# Patient Record
Sex: MALE | Race: WHITE
[De-identification: names, ages, dates, MRNs, and addresses within clinical notes are randomized per-mention and may not be internally consistent; named-entity substitution may affect disease eponyms.]

---

## 2018-11-29 ENCOUNTER — HOSPITAL ENCOUNTER (EMERGENCY)
Dept: HOSPITAL 54 - ER | Age: 33
Discharge: HOME | End: 2018-11-29
Payer: COMMERCIAL

## 2018-11-29 DIAGNOSIS — Z02.89: Primary | ICD-10-CM

## 2018-11-29 LAB
ALBUMIN SERPL BCP-MCNC: 4.5 G/DL (ref 3.4–5)
ALP SERPL-CCNC: 86 U/L (ref 46–116)
ALT SERPL W P-5'-P-CCNC: 54 U/L (ref 12–78)
APAP SERPL-MCNC: 0 UG/ML (ref 10–30)
AST SERPL W P-5'-P-CCNC: 43 U/L (ref 15–37)
BASOPHILS # BLD AUTO: 0.1 /CMM (ref 0–0.2)
BASOPHILS NFR BLD AUTO: 1.3 % (ref 0–2)
BILIRUB DIRECT SERPL-MCNC: 0.1 MG/DL (ref 0–0.2)
BILIRUB SERPL-MCNC: 0.4 MG/DL (ref 0.2–1)
BUN SERPL-MCNC: 10 MG/DL (ref 7–18)
CALCIUM SERPL-MCNC: 9.9 MG/DL (ref 8.5–10.1)
CHLORIDE SERPL-SCNC: 99 MMOL/L (ref 98–107)
CO2 SERPL-SCNC: 27 MMOL/L (ref 21–32)
CREAT SERPL-MCNC: 0.9 MG/DL (ref 0.6–1.3)
EOSINOPHIL NFR BLD AUTO: 0.9 % (ref 0–6)
ETHANOL SERPL-MCNC: < 3 MG/DL (ref 0–0)
GLUCOSE SERPL-MCNC: 105 MG/DL (ref 74–106)
HCT VFR BLD AUTO: 44 % (ref 39–51)
HGB BLD-MCNC: 15.3 G/DL (ref 13.5–17.5)
LYMPHOCYTES NFR BLD AUTO: 1.3 /CMM (ref 0.8–4.8)
LYMPHOCYTES NFR BLD AUTO: 14.1 % (ref 20–44)
MCHC RBC AUTO-ENTMCNC: 35 G/DL (ref 31–36)
MCV RBC AUTO: 82 FL (ref 80–96)
MONOCYTES NFR BLD AUTO: 0.5 /CMM (ref 0.1–1.3)
MONOCYTES NFR BLD AUTO: 5.8 % (ref 2–12)
NEUTROPHILS # BLD AUTO: 7 /CMM (ref 1.8–8.9)
NEUTROPHILS NFR BLD AUTO: 77.9 % (ref 43–81)
PLATELET # BLD AUTO: 334 /CMM (ref 150–450)
POTASSIUM SERPL-SCNC: 4.8 MMOL/L (ref 3.5–5.1)
PROT SERPL-MCNC: 9.3 G/DL (ref 6.4–8.2)
RBC # BLD AUTO: 5.38 MIL/UL (ref 4.5–6)
SALICYLATES SERPL-MCNC: 1.9 MG/DL (ref 2.8–20)
SODIUM SERPL-SCNC: 137 MMOL/L (ref 136–145)
WBC NRBC COR # BLD AUTO: 8.9 K/UL (ref 4.3–11)

## 2018-11-29 PROCEDURE — A4606 OXYGEN PROBE USED W OXIMETER: HCPCS

## 2018-11-29 PROCEDURE — G0480 DRUG TEST DEF 1-7 CLASSES: HCPCS

## 2018-11-29 PROCEDURE — Z7610: HCPCS

## 2018-11-29 NOTE — NUR
PT STATES DENIES SI/HI AT THIS TIME. PT STATES " I JUST WANTED A PLACED TO WARM 
UP, MY DAD LIVES DOWN THE STREET" 



Patient discharged to home in stable condition. Written and verbal after care 
instructions given. Patient verbalizes understanding of instruction. ambulatory 
with a steady gait

## 2020-04-08 ENCOUNTER — HOSPITAL ENCOUNTER (EMERGENCY)
Dept: HOSPITAL 54 - ER | Age: 35
LOS: 1 days | Discharge: PSYCHIATRIC OR SUBSTANCE USE FACILITY | End: 2020-04-09
Payer: COMMERCIAL

## 2020-04-08 VITALS — WEIGHT: 235 LBS | HEIGHT: 71 IN | BODY MASS INDEX: 32.9 KG/M2

## 2020-04-08 DIAGNOSIS — R45.851: Primary | ICD-10-CM

## 2020-04-08 DIAGNOSIS — F20.9: ICD-10-CM

## 2020-04-08 DIAGNOSIS — I10: ICD-10-CM

## 2020-04-08 DIAGNOSIS — E11.9: ICD-10-CM

## 2020-04-08 DIAGNOSIS — Z88.0: ICD-10-CM

## 2020-04-08 DIAGNOSIS — F32.9: ICD-10-CM

## 2020-04-08 LAB
ALBUMIN SERPL BCP-MCNC: 4.3 G/DL (ref 3.4–5)
ALP SERPL-CCNC: 86 U/L (ref 46–116)
ALT SERPL W P-5'-P-CCNC: 32 U/L (ref 12–78)
APAP SERPL-MCNC: 0 UG/ML (ref 10–30)
AST SERPL W P-5'-P-CCNC: 28 U/L (ref 15–37)
BASOPHILS # BLD AUTO: 0.1 /CMM (ref 0–0.2)
BASOPHILS NFR BLD AUTO: 1.5 % (ref 0–2)
BILIRUB DIRECT SERPL-MCNC: 0.1 MG/DL (ref 0–0.2)
BILIRUB SERPL-MCNC: 0.5 MG/DL (ref 0.2–1)
BILIRUB UR QL STRIP: (no result)
BUN SERPL-MCNC: 15 MG/DL (ref 7–18)
CALCIUM SERPL-MCNC: 9.5 MG/DL (ref 8.5–10.1)
CHLORIDE SERPL-SCNC: 104 MMOL/L (ref 98–107)
CO2 SERPL-SCNC: 27 MMOL/L (ref 21–32)
CREAT SERPL-MCNC: 1.2 MG/DL (ref 0.6–1.3)
EOSINOPHIL NFR BLD AUTO: 0.8 % (ref 0–6)
ETHANOL SERPL-MCNC: < 3 MG/DL (ref 0–0)
GLUCOSE SERPL-MCNC: 113 MG/DL (ref 74–106)
HCT VFR BLD AUTO: 46 % (ref 39–51)
HGB BLD-MCNC: 15.5 G/DL (ref 13.5–17.5)
KETONES UR STRIP-MCNC: 15 MG/DL
LYMPHOCYTES NFR BLD AUTO: 2.1 /CMM (ref 0.8–4.8)
LYMPHOCYTES NFR BLD AUTO: 30.9 % (ref 20–44)
MCHC RBC AUTO-ENTMCNC: 34 G/DL (ref 31–36)
MCV RBC AUTO: 86 FL (ref 80–96)
MONOCYTES NFR BLD AUTO: 0.6 /CMM (ref 0.1–1.3)
MONOCYTES NFR BLD AUTO: 9.2 % (ref 2–12)
NEUTROPHILS # BLD AUTO: 3.9 /CMM (ref 1.8–8.9)
NEUTROPHILS NFR BLD AUTO: 57.6 % (ref 43–81)
PH UR STRIP: 6 [PH] (ref 5–8)
PLATELET # BLD AUTO: 286 /CMM (ref 150–450)
POTASSIUM SERPL-SCNC: 4.1 MMOL/L (ref 3.5–5.1)
PROT SERPL-MCNC: 8.3 G/DL (ref 6.4–8.2)
PROT UR QL STRIP: 100 MG/DL
RBC # BLD AUTO: 5.31 MIL/UL (ref 4.5–6)
RBC #/AREA URNS HPF: (no result) /HPF (ref 0–2)
SALICYLATES SERPL-MCNC: 2.4 MG/DL (ref 2.8–20)
SODIUM SERPL-SCNC: 141 MMOL/L (ref 136–145)
UROBILINOGEN UR STRIP-MCNC: 0.2 EU/DL
WBC #/AREA URNS HPF: (no result) /HPF (ref 0–3)
WBC NRBC COR # BLD AUTO: 6.7 K/UL (ref 4.3–11)

## 2020-04-08 PROCEDURE — 80307 DRUG TEST PRSMV CHEM ANLYZR: CPT

## 2020-04-08 PROCEDURE — 80048 BASIC METABOLIC PNL TOTAL CA: CPT

## 2020-04-08 PROCEDURE — 99285 EMERGENCY DEPT VISIT HI MDM: CPT

## 2020-04-08 PROCEDURE — 85025 COMPLETE CBC W/AUTO DIFF WBC: CPT

## 2020-04-08 PROCEDURE — 80076 HEPATIC FUNCTION PANEL: CPT

## 2020-04-08 PROCEDURE — 80305 DRUG TEST PRSMV DIR OPT OBS: CPT

## 2020-04-08 PROCEDURE — 81001 URINALYSIS AUTO W/SCOPE: CPT

## 2020-04-08 PROCEDURE — G0480 DRUG TEST DEF 1-7 CLASSES: HCPCS

## 2020-04-08 PROCEDURE — 80329 ANALGESICS NON-OPIOID 1 OR 2: CPT

## 2020-04-08 PROCEDURE — 36415 COLL VENOUS BLD VENIPUNCTURE: CPT

## 2020-04-08 NOTE — NUR
PER AMRITA FROM [facility], PT IS ACCEPTED TO [facility] AFTER 0700

ACCEPTING MD: DR. LOGAN

NUMBER FOR REPORT: [phone]

ROOM ASSIGNMENT: 106-A

## 2020-04-08 NOTE — NUR
PT AAOX4. AMBULATORY WITH STEADY GAIT. BIBSELF C/O SI TO JUMP OFF A BRIDGE. PT 
PLACED IN BED 13. PLACED IN A GOWN, BELONINGS PLACED IN LOCKER. PT DENIES ANY 
PAIN. RR EVEN AND UNLABORED. VSS. WILL CONTINUE TO MONITOR.

## 2020-04-09 VITALS — SYSTOLIC BLOOD PRESSURE: 129 MMHG | DIASTOLIC BLOOD PRESSURE: 86 MMHG

## 2020-04-09 NOTE — NUR
PT ON CONSTANT OBSERVATION AND RESTING COMFORTABLY IN BED. VSS. NO ACUTE 
DISTRESS NOTED. 1:1 SITTER AT BEDSIDE FOR SAFETY

## 2020-04-09 NOTE — NUR
Patient picked up by [facility] in stable condition. [facility] All belongings returned to the patient.

## 2020-08-06 ENCOUNTER — HOSPITAL ENCOUNTER (EMERGENCY)
Dept: HOSPITAL 54 - ER | Age: 35
LOS: 1 days | Discharge: PSYCHIATRIC OR SUBSTANCE USE FACILITY | End: 2020-08-07
Payer: COMMERCIAL

## 2020-08-06 ENCOUNTER — HOSPITAL ENCOUNTER (EMERGENCY)
Dept: HOSPITAL 87 - ER | Age: 35
Discharge: HOME | End: 2020-08-06
Payer: COMMERCIAL

## 2020-08-06 VITALS — SYSTOLIC BLOOD PRESSURE: 115 MMHG | DIASTOLIC BLOOD PRESSURE: 80 MMHG

## 2020-08-06 VITALS — WEIGHT: 220 LBS | BODY MASS INDEX: 30.8 KG/M2 | HEIGHT: 71 IN

## 2020-08-06 VITALS — HEIGHT: 74 IN | WEIGHT: 220.46 LBS | BODY MASS INDEX: 28.29 KG/M2

## 2020-08-06 DIAGNOSIS — R45.850: ICD-10-CM

## 2020-08-06 DIAGNOSIS — R45.851: Primary | ICD-10-CM

## 2020-08-06 DIAGNOSIS — I10: ICD-10-CM

## 2020-08-06 DIAGNOSIS — R44.0: ICD-10-CM

## 2020-08-06 DIAGNOSIS — E11.9: ICD-10-CM

## 2020-08-06 DIAGNOSIS — R46.2: ICD-10-CM

## 2020-08-06 DIAGNOSIS — Z88.0: ICD-10-CM

## 2020-08-06 DIAGNOSIS — F32.9: ICD-10-CM

## 2020-08-06 LAB
ALBUMIN SERPL BCP-MCNC: 4 G/DL (ref 3.4–5)
ALP SERPL-CCNC: 92 U/L (ref 46–116)
ALT SERPL W P-5'-P-CCNC: 28 U/L (ref 12–78)
AMPHETAMINES UR QL SCN: NEGATIVE
APAP SERPL-MCNC: < 2 UG/ML (ref 10–30)
APPEARANCE UR: CLEAR
AST SERPL W P-5'-P-CCNC: 26 U/L (ref 15–37)
BARBITURATES UR QL SCN: NEGATIVE
BASOPHILS # BLD AUTO: 0 /CMM (ref 0–0.2)
BASOPHILS NFR BLD AUTO: 0.8 % (ref 0–2)
BASOPHILS NFR BLD AUTO: 1 % (ref 0–2)
BENZODIAZ UR QL SCN: NEGATIVE
BILIRUB DIRECT SERPL-MCNC: 0.1 MG/DL (ref 0–0.2)
BILIRUB SERPL-MCNC: 0.2 MG/DL (ref 0.2–1)
BUN SERPL-MCNC: 19 MG/DL (ref 7–18)
BZE UR QL SCN: NEGATIVE
CALCIUM SERPL-MCNC: 9.1 MG/DL (ref 8.5–10.1)
CANNABINOIDS UR QL SCN: (no result)
CHLORIDE SERPL-SCNC: 107 MMOL/L (ref 98–107)
CHLORIDE SERPL-SCNC: 109 MEQ/L (ref 98–107)
CO2 SERPL-SCNC: 28 MMOL/L (ref 21–32)
COLOR UR: YELLOW
CREAT SERPL-MCNC: 1.1 MG/DL (ref 0.6–1.3)
EOSINOPHIL NFR BLD AUTO: 0.1 % (ref 0–5)
EOSINOPHIL NFR BLD AUTO: 1.1 % (ref 0–6)
ERYTHROCYTE [DISTWIDTH] IN BLOOD BY AUTOMATED COUNT: 14.2 % (ref 11.6–14.6)
ETHANOL SERPL-MCNC: < 10 MG/DL
ETHANOL SERPL-MCNC: < 3 MG/DL (ref 0–0)
GLUCOSE SERPL-MCNC: 103 MG/DL (ref 74–106)
HCT VFR BLD AUTO: 41.2 % (ref 42–52)
HCT VFR BLD AUTO: 43 % (ref 39–51)
HGB BLD-MCNC: 14 G/DL (ref 14–18)
HGB BLD-MCNC: 14.3 G/DL (ref 13.5–17.5)
HGB UR QL STRIP: NEGATIVE
KETONES UR STRIP-MCNC: NEGATIVE MG/DL
LEUKOCYTE ESTERASE UR QL STRIP: NEGATIVE
LYMPHOCYTES NFR BLD AUTO: 2.4 /CMM (ref 0.8–4.8)
LYMPHOCYTES NFR BLD AUTO: 22.8 % (ref 20–50)
LYMPHOCYTES NFR BLD AUTO: 37.5 % (ref 20–44)
MCH RBC QN AUTO: 28.7 PG (ref 28–32)
MCHC RBC AUTO-ENTMCNC: 34 G/DL (ref 31–36)
MCV RBC AUTO: 84.7 FL (ref 80–94)
MCV RBC AUTO: 86 FL (ref 80–96)
METHADONE UR QL SCN: NEGATIVE
MONOCYTES NFR BLD AUTO: 0.8 /CMM (ref 0.1–1.3)
MONOCYTES NFR BLD AUTO: 11.1 % (ref 2–8)
MONOCYTES NFR BLD AUTO: 12.1 % (ref 2–12)
NEUTROPHILS # BLD AUTO: 3 /CMM (ref 1.8–8.9)
NEUTROPHILS NFR BLD AUTO: 48.5 % (ref 43–81)
NEUTROPHILS NFR BLD AUTO: 65 % (ref 40–76)
NITRITE UR QL STRIP: NEGATIVE
OPIATES UR QL SCN: NEGATIVE
PCP UR QL SCN: NEGATIVE
PH UR STRIP: 5.5 [PH] (ref 4.5–8)
PLATELET # BLD AUTO: 244 X1000/UL (ref 130–400)
PLATELET # BLD AUTO: 264 /CMM (ref 150–450)
PMV BLD AUTO: 7.9 FL (ref 7.4–10.4)
POTASSIUM SERPL-SCNC: 3.8 MMOL/L (ref 3.5–5.1)
PROT SERPL-MCNC: 7.9 G/DL (ref 6.4–8.2)
PROT UR QL STRIP: NEGATIVE
RBC # BLD AUTO: 4.86 MILL/UL (ref 4.7–6.1)
RBC # BLD AUTO: 4.97 MIL/UL (ref 4.5–6)
SALICYLATES SERPL-MCNC: 4.7 MG/DL (ref 2.8–20)
SODIUM SERPL-SCNC: 141 MMOL/L (ref 136–145)
SP GR UR STRIP: 1.01 (ref 1–1.03)
UROBILINOGEN UR STRIP-MCNC: 0.2 E.U./DL (ref 0.2–1)
WBC NRBC COR # BLD AUTO: 6.3 K/UL (ref 4.3–11)

## 2020-08-06 PROCEDURE — 82140 ASSAY OF AMMONIA: CPT

## 2020-08-06 PROCEDURE — 81003 URINALYSIS AUTO W/O SCOPE: CPT

## 2020-08-06 PROCEDURE — 36415 COLL VENOUS BLD VENIPUNCTURE: CPT

## 2020-08-06 PROCEDURE — 80320 DRUG SCREEN QUANTALCOHOLS: CPT

## 2020-08-06 PROCEDURE — 99285 EMERGENCY DEPT VISIT HI MDM: CPT

## 2020-08-06 PROCEDURE — 85025 COMPLETE CBC W/AUTO DIFF WBC: CPT

## 2020-08-06 PROCEDURE — 96372 THER/PROPH/DIAG INJ SC/IM: CPT

## 2020-08-06 PROCEDURE — 80048 BASIC METABOLIC PNL TOTAL CA: CPT

## 2020-08-06 PROCEDURE — 80053 COMPREHEN METABOLIC PANEL: CPT

## 2020-08-06 PROCEDURE — 83880 ASSAY OF NATRIURETIC PEPTIDE: CPT

## 2020-08-06 PROCEDURE — 80329 ANALGESICS NON-OPIOID 1 OR 2: CPT

## 2020-08-06 PROCEDURE — 80305 DRUG TEST PRSMV DIR OPT OBS: CPT

## 2020-08-06 PROCEDURE — 80307 DRUG TEST PRSMV CHEM ANLYZR: CPT

## 2020-08-06 PROCEDURE — 81001 URINALYSIS AUTO W/SCOPE: CPT

## 2020-08-06 PROCEDURE — 84484 ASSAY OF TROPONIN QUANT: CPT

## 2020-08-06 PROCEDURE — 83690 ASSAY OF LIPASE: CPT

## 2020-08-06 PROCEDURE — 80076 HEPATIC FUNCTION PANEL: CPT

## 2020-08-06 PROCEDURE — G0480 DRUG TEST DEF 1-7 CLASSES: HCPCS

## 2020-08-06 PROCEDURE — 96360 HYDRATION IV INFUSION INIT: CPT

## 2020-08-06 NOTE — NUR
PT CAME TO THE ED C/O SI W/ A PLAN TO JUMP OFF THE BUILDING AND HI W/ A PLAN TO 
HURT ELDERLY PEOPLE. PT CHANGED INTO GOWN, BELONGINGS PLACED TO LOCKER 
AAOX4,VSS, RESPIRATIONS EVEN AND UNLABORED ON RA W/ NAD NOTED. SUICIDE PROTOCOL 
IMPLEMENTED. SITTER AT BEDSIDE FOR SAFETY

## 2020-08-07 VITALS — SYSTOLIC BLOOD PRESSURE: 127 MMHG | DIASTOLIC BLOOD PRESSURE: 89 MMHG

## 2020-08-07 LAB
PH UR STRIP: 6.5 [PH] (ref 5–8)
UROBILINOGEN UR STRIP-MCNC: 0.2 EU/DL

## 2020-08-07 NOTE — NUR
PT RESTING COMFORTABLY IN BED. VSS. NO ACUTE DISTRESS NOTED. WILL CONTINUE TO 
MONITOR. SITTER AT BEDSIDE FOR SAFETY

## 2020-08-07 NOTE — NUR
ACCEPTED AT [facility] AT 7AM

DR. BETH

NUMBER FOR REPORT: [phone]

ROOM 1176 UNIT P6

## 2020-08-07 NOTE — NUR
PATIENT IS SLEEPING. EASILY AROUSABLE THROUGH TOUCH AND VERBAL STIMULI. 
CONNECTED TO MONITOR. PATIENT IS BREATHING EVENLY AND UNLABORED ON ROOM AIR. 
SIDE RAILS UP FOR SAFETY. SITTER AT BEDSIDE.

## 2021-05-11 NOTE — NUR
PT BIB SELF C/O SI "I WANT TO RUN THRU TRAFFIC" PT IS AAOX4, NOT IN RESPIRATORY 
DISTRESS, V/S STABLE, KEPT RESTED AND COMFORTABLE. SITTER AT BEDSIDE. WILL 
CONTINUE TO MONITOR.

## 2021-05-12 NOTE — NUR
PATINET USING URINAL, V/S WNL, RR EVEN AND UNALBORED, TOLERATING ROOM AIR. NO 
ACUTE DISTRESS NOTED AT THIS TIME.

## 2021-07-21 NOTE — NUR
REPORT GIVEN TO CASIMIRO STAFFORD AT [facility]. 

-------------------------------------------------------------------------------

Addendum: 07/21/21 at 1856 by KEYA

-------------------------------------------------------------------------------

CORRECTION: 

[facility].

## 2021-07-21 NOTE — NUR
PT SITTING UP EATING LUNCH, PT MONICA WELL. PT CALM & COOPERATIVE, NAD NOTED AT 
THIS TIME. SITTER AT BS.

## 2021-07-21 NOTE — NUR
RECEIVED A CALL FROM [facility]. PATIENT HAS BEEN ACCEPTED AND WILL BE GOING 
TO [facility] [address]. ACCEPTING MD DR. GONG.



NUMBER FOR REPORT [phone].

## 2021-07-21 NOTE — NUR
CALLED [facility] FOR TRANSPORT TO TO [facility]. ETA 75 MINUTES FOR AMBULANCE TRANSFER.

## 2021-07-21 NOTE — NUR
PT AAOX4. BIBSELF C/O SUICIDAL WITH PLAN TO JUMP IN FRONT OF TRAFFIC. 
REQUESTING VOLUNTARY PSYCH ADMISSION. -HI. PLACED IN BED 12, IN GOWN, ON 
MONITOR, AND PULSE OX. BELONINGS PLACED IN LOCKER. SITTER AT BEDSIDE. AWAITING 
ER MD FOR EVAL AND ORDERS.

## 2021-07-21 NOTE — NUR
consult: 



 consult requested for suicidal ideation and substance use. 
Patient is a 35-year-old,  male. SW met with patient at his bedside in 
the emergency department. Patient was resting and presented with a depressed 
mood. Patient appeared well-groomed. Patient was alert and oriented x4. 



Per chart, patient was brought in by self on 07/21/21 with complaints of 
suicidal ideation with a plan to run into traffic. 



Patient stated that he is currently living with his cousins. Patient stated 
that he has adequate social support from his family. Patient stated that he 
currently has no source of income but receives financial support from his 
cousins. SW asked patient about the situation which brought the patient to the 
hospital and patient reported, "I was at a friend's house and I was drinking. 
Later on, I went in front of traffic." SW asked the patient about his history 
of substance and patient reported a history of alcohol use and stated "once 
like every three to four days." Patient reported that he drinks up to five 
bottles of alcohol within a day. Patient denied drug use. Per toxicology 
report, patient is positive for cannabis use. SW assessed patient's history of 
mental illness. Patient reported Schizophrenia and stated that he is currently 
taking Seroquel and Depakote. Patient reported a history of auditory and visual 
hallucinations and stated, "I used to see things but not anymore but I hear 
number tones and voices telling me how many times to run into traffic." Patient 
reported current suicidal ideation with a plan to run into traffic. Patient 
denied homicidal ideation. 



SW offered the patient outpatient mental health and substance use resources. 
Patient accepted the resources and thanked SW. 



Patient requested voluntary psychiatric admission. [facility] will follow up and fax 
clinicals to [facility], [phone], for review.



PLAN: 

SW will fax clinicals to [facility], for review.



RESOURCES:

Counseling--Outpatient



[facility]

[address], [address]

[place] [address] [phone]

(Specializes in in-depth psychotherapy for emotional distress: anxiety, 
depression, interpersonal conflicts, life transitions, childhood abuse)



PSYCHIATRIC OUTPATIENT SERVICES

[facility] Partial Hospitalization and Intensive Outpatient Program 
([facility] Care and [place] Only)                [address]

[facility] [address] [phone]



[facility]

Partial Hospitalization and Outpatient Program  [address]  [address]

[place], [place] [id number] [phone]

[facility] Partial Hospitalization and Outpatient Program   [address]

[place] [address]               [phone]

RONA GARCIA

[facility]            [address]

[place] [address]         [phone]

[facility] Partial Hospitalization and Outpatient 
Program         [address] [place] [place]     [phone]



Substance use resources provided included: 

[facility] Substance Abuse Self-Helpline (SAS) [phone]; [facility] [address] [place] [address] [phone]; [facility] [address] [facility] [address] [phone]; [facility] [address] [address] [phone];  [facility] [address] [facility] [id number] [phone]; 
[facility] [address] [facility] [id number] [phone]; [facility] [phone] [place]; [facility]  [phone] [place];  
[facility] [phone] [place]; Alcoholics Anonymous [phone]

## 2021-11-25 NOTE — NUR
[facility] NURSE WILL CALL BACK FOR REPORT

## 2021-11-25 NOTE — NUR
PT SUDDENLY AGITATED, YELLING,  HITTING SELF IN FACE. DR PALOMO NOTIFIED. 
MEDICATION ORDERS RECIEVED.

## 2021-11-25 NOTE — NUR
PATIENT IN BED ASLEEP, EASILY AROUSABLE BY VOICE. HOOKED TO MONITOR. VSS. WILL 
CONTINUE TO MONITOR. KEPT WARM AND SAFE

## 2021-11-25 NOTE — NUR
PT IS AWAKE, AAOX4, NOT IN RESPIRATORY DISTRESS, V/S STABLE, KEPT RESTED AND 
COMFORTABLE. WILL CONTINUE TO MONITOR.

## 2021-11-25 NOTE — NUR
CALLED [facility], NOTIFIED THAT PT HAS NOT BEEN ACCEPTED TO RE FAX CLINICALS, THEY 
ARE FULL HOUSE AT THIS TIME.

## 2021-11-25 NOTE — NUR
PT IN BED SLEEPING AND EASILY ARROUSABLE. PT COMFORTABLE; NOT AGGITATED OR 
RESTLESS AT THIS TIME. SAFETY MEASURES IN PLACE

## 2022-08-10 ENCOUNTER — HOSPITAL ENCOUNTER (EMERGENCY)
Dept: HOSPITAL 87 - ER | Age: 37
LOS: 1 days | Discharge: HOME | End: 2022-08-11
Payer: COMMERCIAL

## 2022-08-10 VITALS — HEIGHT: 72 IN | WEIGHT: 200.62 LBS | BODY MASS INDEX: 27.17 KG/M2

## 2022-08-10 DIAGNOSIS — Z20.822: ICD-10-CM

## 2022-08-10 DIAGNOSIS — R11.10: ICD-10-CM

## 2022-08-10 DIAGNOSIS — I10: ICD-10-CM

## 2022-08-10 DIAGNOSIS — F32.9: ICD-10-CM

## 2022-08-10 DIAGNOSIS — R45.851: Primary | ICD-10-CM

## 2022-08-10 DIAGNOSIS — Z91.14: ICD-10-CM

## 2022-08-10 DIAGNOSIS — Z85.038: ICD-10-CM

## 2022-08-10 DIAGNOSIS — F20.9: ICD-10-CM

## 2022-08-10 LAB
APPEARANCE UR: CLEAR
BASOPHILS NFR BLD AUTO: 0.7 % (ref 0–2)
CHLORIDE SERPL-SCNC: 108 MEQ/L (ref 98–107)
COLOR UR: YELLOW
EOSINOPHIL NFR BLD AUTO: 2.7 % (ref 0–5)
ERYTHROCYTE [DISTWIDTH] IN BLOOD BY AUTOMATED COUNT: 15.6 % (ref 11.6–14.6)
ETHANOL SERPL-MCNC: < 10 MG/DL
HCT VFR BLD AUTO: 37.5 % (ref 42–52)
HGB BLD-MCNC: 12.6 G/DL (ref 14–18)
HGB UR QL STRIP: NEGATIVE
KETONES UR STRIP-MCNC: NEGATIVE MG/DL
LEUKOCYTE ESTERASE UR QL STRIP: NEGATIVE
LYMPHOCYTES NFR BLD AUTO: 40.6 % (ref 20–50)
MCH RBC QN AUTO: 28.3 PG (ref 28–32)
MCV RBC AUTO: 84.2 FL (ref 80–94)
MONOCYTES NFR BLD AUTO: 10.7 % (ref 2–8)
NEUTROPHILS NFR BLD AUTO: 45.3 % (ref 40–76)
NITRITE UR QL STRIP: NEGATIVE
PH UR STRIP: 6 [PH] (ref 4.5–8)
PLATELET # BLD AUTO: 226 X1000/UL (ref 130–400)
PMV BLD AUTO: 7.9 FL (ref 7.4–10.4)
PROT UR QL STRIP: NEGATIVE
RBC # BLD AUTO: 4.45 MILL/UL (ref 4.7–6.1)
SP GR UR STRIP: 1.01 (ref 1–1.03)
UROBILINOGEN UR STRIP-MCNC: 0.2 E.U./DL (ref 0.2–1)

## 2022-08-10 PROCEDURE — 80320 DRUG SCREEN QUANTALCOHOLS: CPT

## 2022-08-10 PROCEDURE — G0480 DRUG TEST DEF 1-7 CLASSES: HCPCS

## 2022-08-10 PROCEDURE — 85025 COMPLETE CBC W/AUTO DIFF WBC: CPT

## 2022-08-10 PROCEDURE — U0003 INFECTIOUS AGENT DETECTION BY NUCLEIC ACID (DNA OR RNA); SEVERE ACUTE RESPIRATORY SYNDROME CORONAVIRUS 2 (SARS-COV-2) (CORONAVIRUS DISEASE [COVID-19]), AMPLIFIED PROBE TECHNIQUE, MAKING USE OF HIGH THROUGHPUT TECHNOLOGIES AS DESCRIBED BY CMS-2020-01-R: HCPCS

## 2022-08-10 PROCEDURE — 36415 COLL VENOUS BLD VENIPUNCTURE: CPT

## 2022-08-10 PROCEDURE — 99285 EMERGENCY DEPT VISIT HI MDM: CPT

## 2022-08-10 PROCEDURE — 81003 URINALYSIS AUTO W/O SCOPE: CPT

## 2022-08-10 PROCEDURE — 83690 ASSAY OF LIPASE: CPT

## 2022-08-10 PROCEDURE — 80305 DRUG TEST PRSMV DIR OPT OBS: CPT

## 2022-08-10 PROCEDURE — 93005 ELECTROCARDIOGRAM TRACING: CPT

## 2022-08-10 PROCEDURE — 80053 COMPREHEN METABOLIC PANEL: CPT

## 2022-08-10 PROCEDURE — 96372 THER/PROPH/DIAG INJ SC/IM: CPT

## 2022-08-10 PROCEDURE — C9803 HOPD COVID-19 SPEC COLLECT: HCPCS

## 2022-08-11 ENCOUNTER — HOSPITAL ENCOUNTER (EMERGENCY)
Dept: HOSPITAL 87 - ER | Age: 37
LOS: 1 days | Discharge: HOME | End: 2022-08-12
Payer: MEDICAID

## 2022-08-11 VITALS — SYSTOLIC BLOOD PRESSURE: 130 MMHG | DIASTOLIC BLOOD PRESSURE: 84 MMHG

## 2022-08-11 VITALS — HEIGHT: 71 IN | BODY MASS INDEX: 27.78 KG/M2 | WEIGHT: 198.42 LBS

## 2022-08-11 DIAGNOSIS — F20.9: ICD-10-CM

## 2022-08-11 DIAGNOSIS — I10: ICD-10-CM

## 2022-08-11 DIAGNOSIS — Z85.6: ICD-10-CM

## 2022-08-11 DIAGNOSIS — R45.851: Primary | ICD-10-CM

## 2022-08-11 DIAGNOSIS — Z20.822: ICD-10-CM

## 2022-08-11 DIAGNOSIS — F31.9: ICD-10-CM

## 2022-08-11 LAB
AMPHETAMINES UR QL SCN: NEGATIVE
AMPHETAMINES UR QL SCN: NEGATIVE
APPEARANCE UR: CLEAR
BARBITURATES UR QL SCN: NEGATIVE
BARBITURATES UR QL SCN: NEGATIVE
BASOPHILS NFR BLD AUTO: 0.8 % (ref 0–2)
BENZODIAZ UR QL SCN: (no result)
BENZODIAZ UR QL SCN: NEGATIVE
BZE UR QL SCN: NEGATIVE
BZE UR QL SCN: NEGATIVE
CANNABINOIDS UR QL SCN: (no result)
CANNABINOIDS UR QL SCN: (no result)
CHLORIDE SERPL-SCNC: 103 MEQ/L (ref 98–107)
COLOR UR: YELLOW
EOSINOPHIL NFR BLD AUTO: 0.3 % (ref 0–5)
ERYTHROCYTE [DISTWIDTH] IN BLOOD BY AUTOMATED COUNT: 15.6 % (ref 11.6–14.6)
ETHANOL SERPL-MCNC: < 10 MG/DL
HCT VFR BLD AUTO: 41 % (ref 42–52)
HGB BLD-MCNC: 13.8 G/DL (ref 14–18)
HGB UR QL STRIP: NEGATIVE
KETONES UR STRIP-MCNC: NEGATIVE MG/DL
LEUKOCYTE ESTERASE UR QL STRIP: NEGATIVE
LYMPHOCYTES NFR BLD AUTO: 17.2 % (ref 20–50)
MCH RBC QN AUTO: 28.4 PG (ref 28–32)
MCV RBC AUTO: 84.4 FL (ref 80–94)
METHADONE UR QL SCN: NEGATIVE
METHADONE UR QL SCN: NEGATIVE
MONOCYTES NFR BLD AUTO: 7.3 % (ref 2–8)
NEUTROPHILS NFR BLD AUTO: 74.4 % (ref 40–76)
NITRITE UR QL STRIP: NEGATIVE
OPIATES UR QL SCN: NEGATIVE
OPIATES UR QL SCN: NEGATIVE
PCP UR QL SCN: NEGATIVE
PCP UR QL SCN: NEGATIVE
PH UR STRIP: 6 [PH] (ref 4.5–8)
PLATELET # BLD AUTO: 260 X1000/UL (ref 130–400)
PMV BLD AUTO: 8.1 FL (ref 7.4–10.4)
PROT UR QL STRIP: NEGATIVE
RBC # BLD AUTO: 4.86 MILL/UL (ref 4.7–6.1)
SP GR UR STRIP: 1.01 (ref 1–1.03)
UROBILINOGEN UR STRIP-MCNC: 0.2 E.U./DL (ref 0.2–1)

## 2022-08-11 PROCEDURE — 96372 THER/PROPH/DIAG INJ SC/IM: CPT

## 2022-08-11 PROCEDURE — 99285 EMERGENCY DEPT VISIT HI MDM: CPT

## 2022-08-11 PROCEDURE — 81003 URINALYSIS AUTO W/O SCOPE: CPT

## 2022-08-11 PROCEDURE — 85025 COMPLETE CBC W/AUTO DIFF WBC: CPT

## 2022-08-11 PROCEDURE — 80307 DRUG TEST PRSMV CHEM ANLYZR: CPT

## 2022-08-11 PROCEDURE — C9803 HOPD COVID-19 SPEC COLLECT: HCPCS

## 2022-08-11 PROCEDURE — 80329 ANALGESICS NON-OPIOID 1 OR 2: CPT

## 2022-08-11 PROCEDURE — G0480 DRUG TEST DEF 1-7 CLASSES: HCPCS

## 2022-08-11 PROCEDURE — 70450 CT HEAD/BRAIN W/O DYE: CPT

## 2022-08-11 PROCEDURE — 87426 SARSCOV CORONAVIRUS AG IA: CPT

## 2022-08-11 PROCEDURE — 80053 COMPREHEN METABOLIC PANEL: CPT

## 2022-08-11 PROCEDURE — 80305 DRUG TEST PRSMV DIR OPT OBS: CPT

## 2022-08-11 PROCEDURE — A4315 CATH W/DRAINAGE 2-WAY SILCNE: HCPCS

## 2022-08-11 PROCEDURE — 36415 COLL VENOUS BLD VENIPUNCTURE: CPT

## 2022-08-11 PROCEDURE — 80320 DRUG SCREEN QUANTALCOHOLS: CPT

## 2022-08-12 VITALS — DIASTOLIC BLOOD PRESSURE: 92 MMHG | SYSTOLIC BLOOD PRESSURE: 159 MMHG

## 2022-09-26 NOTE — NUR
PT IN BED EATING FOOD. RESPIRATIONS EVEN AND NONLABORED ON ROOM AIR. ALL NEEDS 
MET AT THIS TIME fall precautions